# Patient Record
Sex: FEMALE | Race: OTHER | Employment: FULL TIME | URBAN - METROPOLITAN AREA
[De-identification: names, ages, dates, MRNs, and addresses within clinical notes are randomized per-mention and may not be internally consistent; named-entity substitution may affect disease eponyms.]

---

## 2018-09-28 ENCOUNTER — OFFICE VISIT (OUTPATIENT)
Dept: FAMILY MEDICINE CLINIC | Facility: CLINIC | Age: 63
End: 2018-09-28

## 2018-09-28 DIAGNOSIS — Z02.9 ADMINISTRATIVE ENCOUNTER: Primary | ICD-10-CM

## 2018-09-28 NOTE — PROGRESS NOTES
Patient presents with 1 day h/o light headedness and chest tightness. She states she just feels \"weird\". Denies chest pain, shortness of breath, dizziness, nausea/vomiting.  States problem could be due to anxiety but not sure if that's what is going on to

## 2020-01-13 ENCOUNTER — OFFICE VISIT (OUTPATIENT)
Dept: OBGYN CLINIC | Facility: CLINIC | Age: 65
End: 2020-01-13
Payer: COMMERCIAL

## 2020-01-13 VITALS
DIASTOLIC BLOOD PRESSURE: 80 MMHG | BODY MASS INDEX: 32.84 KG/M2 | SYSTOLIC BLOOD PRESSURE: 146 MMHG | HEIGHT: 63 IN | WEIGHT: 185.31 LBS

## 2020-01-13 DIAGNOSIS — Z12.4 SCREENING FOR MALIGNANT NEOPLASM OF THE CERVIX: ICD-10-CM

## 2020-01-13 DIAGNOSIS — Z01.419 WOMEN'S ANNUAL ROUTINE GYNECOLOGICAL EXAMINATION: Primary | ICD-10-CM

## 2020-01-13 PROCEDURE — 99386 PREV VISIT NEW AGE 40-64: CPT | Performed by: OBSTETRICS & GYNECOLOGY

## 2020-01-13 PROCEDURE — 87624 HPV HI-RISK TYP POOLED RSLT: CPT | Performed by: OBSTETRICS & GYNECOLOGY

## 2020-01-13 NOTE — PROGRESS NOTES
NEW GYN H&P     2020  12:55 PM    Patient presents with:  New Patient: Annual exam and pap smear   Mammogram Screening: needs mammogram order   .     HPI: Patient is a 60year old  LMP  here to establish care with annual exam - due for PAP a determinant information on file (likely too young).     Social History Narrative      No H/o abuse       ROS:     Review of Systems:  General: no complaints  Eyes: no complaints  Respiratory: no complaints  Cardiovascular: no complaints  GI: denies abdomina

## 2020-01-14 LAB — HPV I/H RISK 1 DNA SPEC QL NAA+PROBE: NEGATIVE

## 2020-08-18 ENCOUNTER — HOSPITAL ENCOUNTER (OUTPATIENT)
Dept: MAMMOGRAPHY | Age: 65
Discharge: HOME | End: 2020-08-18
Attending: OBSTETRICS & GYNECOLOGY
Payer: COMMERCIAL

## 2020-08-18 DIAGNOSIS — Z01.419 WOMEN'S ANNUAL ROUTINE GYNECOLOGICAL EXAMINATION: ICD-10-CM

## 2020-08-18 PROCEDURE — 77067 SCR MAMMO BI INCL CAD: CPT | Performed by: OBSTETRICS & GYNECOLOGY

## 2020-08-18 PROCEDURE — 77063 BREAST TOMOSYNTHESIS BI: CPT | Performed by: OBSTETRICS & GYNECOLOGY

## 2022-08-10 ENCOUNTER — OFFICE VISIT (OUTPATIENT)
Dept: OBGYN CLINIC | Facility: CLINIC | Age: 67
End: 2022-08-10
Payer: COMMERCIAL

## 2022-08-10 VITALS
BODY MASS INDEX: 31.56 KG/M2 | SYSTOLIC BLOOD PRESSURE: 128 MMHG | WEIGHT: 178.13 LBS | DIASTOLIC BLOOD PRESSURE: 80 MMHG | HEIGHT: 63 IN

## 2022-08-10 DIAGNOSIS — Z01.419 WOMEN'S ANNUAL ROUTINE GYNECOLOGICAL EXAMINATION: ICD-10-CM

## 2022-08-10 DIAGNOSIS — Z12.31 BREAST CANCER SCREENING BY MAMMOGRAM: ICD-10-CM

## 2022-08-10 DIAGNOSIS — Z12.4 ROUTINE CERVICAL SMEAR: Primary | ICD-10-CM

## 2022-08-10 PROCEDURE — 87624 HPV HI-RISK TYP POOLED RSLT: CPT | Performed by: OBSTETRICS & GYNECOLOGY

## 2022-08-10 PROCEDURE — 99397 PER PM REEVAL EST PAT 65+ YR: CPT | Performed by: OBSTETRICS & GYNECOLOGY

## 2022-08-10 PROCEDURE — 3079F DIAST BP 80-89 MM HG: CPT | Performed by: OBSTETRICS & GYNECOLOGY

## 2022-08-10 PROCEDURE — 3074F SYST BP LT 130 MM HG: CPT | Performed by: OBSTETRICS & GYNECOLOGY

## 2022-08-10 PROCEDURE — 3008F BODY MASS INDEX DOCD: CPT | Performed by: OBSTETRICS & GYNECOLOGY

## 2022-08-11 LAB — HPV I/H RISK 1 DNA SPEC QL NAA+PROBE: NEGATIVE

## 2022-08-30 ENCOUNTER — TELEPHONE (OUTPATIENT)
Dept: OBGYN CLINIC | Facility: CLINIC | Age: 67
End: 2022-08-30

## 2022-08-30 NOTE — TELEPHONE ENCOUNTER
Pt is returning a call that was made to her on 08/26 to call back to talk to EB. Pt also stated she would like to know about her pap smear results. Please follow up with pt.

## 2022-09-01 ENCOUNTER — HOSPITAL ENCOUNTER (OUTPATIENT)
Dept: MAMMOGRAPHY | Age: 67
Discharge: HOME | End: 2022-09-01
Attending: OBSTETRICS & GYNECOLOGY
Payer: COMMERCIAL

## 2022-09-01 DIAGNOSIS — Z12.31 BREAST CANCER SCREENING BY MAMMOGRAM: ICD-10-CM

## 2022-09-01 PROCEDURE — 77063 BREAST TOMOSYNTHESIS BI: CPT | Performed by: OBSTETRICS & GYNECOLOGY

## 2022-09-01 PROCEDURE — 77067 SCR MAMMO BI INCL CAD: CPT | Performed by: OBSTETRICS & GYNECOLOGY

## 2024-10-30 ENCOUNTER — HOSPITAL ENCOUNTER (OUTPATIENT)
Dept: MAMMOGRAPHY | Age: 69
Discharge: HOME | End: 2024-10-30
Attending: INTERNAL MEDICINE
Payer: COMMERCIAL

## 2024-10-30 DIAGNOSIS — Z12.31 ENCOUNTER FOR SCREENING MAMMOGRAM FOR MALIGNANT NEOPLASM OF BREAST: ICD-10-CM

## 2024-10-30 PROCEDURE — 77063 BREAST TOMOSYNTHESIS BI: CPT | Performed by: INTERNAL MEDICINE

## 2024-10-30 PROCEDURE — 77067 SCR MAMMO BI INCL CAD: CPT | Performed by: INTERNAL MEDICINE

## (undated) NOTE — MR AVS SNAPSHOT
After Visit Summary   1/13/2020    Nandini Roberto    MRN: [id number]           Visit Information     Date & Time  1/13/2020 12:30 PM Provider  Luz Elena Murcia, [address] [facility], [facility] Dept.  Phone  [phone] may be held responsible for payment in full if proper authorization is not acquired. Please contact the Patient Business Office at [phone] if you have   any questions related to insurance coverage. Thank you.     Children: Children under the age of 3 8. Enter your e-mail address. You will receive e-mail notification when new information is available in [address]. Click Sign In. You can now view your medical record.     Additional Information  If you have questions, you can call [phone] to talk Eat plenty of low-fat dairy products High fat meats and dairy   Choose whole grain products Foods high in sodium   Water is best for hydration Fast food.    Eat at home when possible     Tips for increasing your physical activity – Adults who are physically Primary Care Providers  Treatment for mild illness or injury that does not require immediate attention  VIDEO VISITS  Average cost  $35*    e-VISTS  Average cost  $35*       SAME DAY APPOINTMENTS   Available at primary care offices    [address] [place] [address]